# Patient Record
Sex: FEMALE | Race: WHITE
[De-identification: names, ages, dates, MRNs, and addresses within clinical notes are randomized per-mention and may not be internally consistent; named-entity substitution may affect disease eponyms.]

---

## 2019-04-17 ENCOUNTER — HOSPITAL ENCOUNTER (EMERGENCY)
Dept: HOSPITAL 10 - FTE | Age: 54
Discharge: HOME | End: 2019-04-17
Payer: COMMERCIAL

## 2019-04-17 ENCOUNTER — HOSPITAL ENCOUNTER (EMERGENCY)
Dept: HOSPITAL 91 - FTE | Age: 54
Discharge: HOME | End: 2019-04-17
Payer: COMMERCIAL

## 2019-04-17 VITALS — HEART RATE: 73 BPM | SYSTOLIC BLOOD PRESSURE: 163 MMHG | RESPIRATION RATE: 18 BRPM | DIASTOLIC BLOOD PRESSURE: 100 MMHG

## 2019-04-17 VITALS
BODY MASS INDEX: 31.25 KG/M2 | WEIGHT: 176.37 LBS | BODY MASS INDEX: 31.25 KG/M2 | HEIGHT: 63 IN | HEIGHT: 63 IN | WEIGHT: 176.37 LBS

## 2019-04-17 DIAGNOSIS — L03.90: Primary | ICD-10-CM

## 2019-04-17 DIAGNOSIS — F17.210: ICD-10-CM

## 2019-04-17 LAB
ADD MAN DIFF?: NO
ALANINE AMINOTRANSFERASE: 33 IU/L (ref 13–69)
ALBUMIN/GLOBULIN RATIO: 0.97
ALBUMIN: 3.6 G/DL (ref 3.3–4.9)
ALKALINE PHOSPHATASE: 102 IU/L (ref 42–121)
ANION GAP: 6 (ref 5–13)
ASPARTATE AMINO TRANSFERASE: 63 IU/L (ref 15–46)
BASOPHIL #: 0.1 10^3/UL (ref 0–0.1)
BASOPHILS %: 0.8 % (ref 0–2)
BILIRUBIN,DIRECT: 0 MG/DL (ref 0–0.2)
BILIRUBIN,TOTAL: 1.2 MG/DL (ref 0.2–1.3)
BLOOD UREA NITROGEN: 9 MG/DL (ref 7–20)
CALCIUM: 8.9 MG/DL (ref 8.4–10.2)
CARBON DIOXIDE: 36 MMOL/L (ref 21–31)
CHLORIDE: 96 MMOL/L (ref 97–110)
CREATININE: 0.58 MG/DL (ref 0.44–1)
EOSINOPHILS #: 0.2 10^3/UL (ref 0–0.5)
EOSINOPHILS %: 2.1 % (ref 0–7)
GLOBULIN: 3.7 G/DL (ref 1.3–3.2)
GLUCOSE: 81 MG/DL (ref 70–220)
HEMATOCRIT: 45.2 % (ref 37–47)
HEMOGLOBIN: 14.8 G/DL (ref 12–16)
IMMATURE GRANS #M: 0.02 10^3/UL (ref 0–0.03)
IMMATURE GRANS % (M): 0.3 % (ref 0–0.43)
LYMPHOCYTES #: 1.6 10^3/UL (ref 0.8–2.9)
LYMPHOCYTES %: 22.1 % (ref 15–51)
MEAN CORPUSCULAR HEMOGLOBIN: 35.9 PG (ref 29–33)
MEAN CORPUSCULAR HGB CONC: 32.7 G/DL (ref 32–37)
MEAN CORPUSCULAR VOLUME: 109.7 FL (ref 82–101)
MEAN PLATELET VOLUME: 9.5 FL (ref 7.4–10.4)
MONOCYTE #: 0.8 10^3/UL (ref 0.3–0.9)
MONOCYTES %: 11.4 % (ref 0–11)
NEUTROPHIL #: 4.6 10^3/UL (ref 1.6–7.5)
NEUTROPHILS %: 63.3 % (ref 39–77)
NUCLEATED RED BLOOD CELLS #: 0 10^3/UL (ref 0–0)
NUCLEATED RED BLOOD CELLS%: 0 /100WBC (ref 0–0)
PLATELET COUNT: 196 10^3/UL (ref 140–415)
POTASSIUM: 3.9 MMOL/L (ref 3.5–5.1)
RED BLOOD COUNT: 4.12 10^6/UL (ref 4.2–5.4)
RED CELL DISTRIBUTION WIDTH: 14.4 % (ref 11.5–14.5)
SODIUM: 138 MMOL/L (ref 135–144)
TOTAL PROTEIN: 7.3 G/DL (ref 6.1–8.1)
WHITE BLOOD COUNT: 7.2 10^3/UL (ref 4.8–10.8)

## 2019-04-17 PROCEDURE — 36415 COLL VENOUS BLD VENIPUNCTURE: CPT

## 2019-04-17 PROCEDURE — 87040 BLOOD CULTURE FOR BACTERIA: CPT

## 2019-04-17 PROCEDURE — 80053 COMPREHEN METABOLIC PANEL: CPT

## 2019-04-17 PROCEDURE — 85025 COMPLETE CBC W/AUTO DIFF WBC: CPT

## 2019-04-17 PROCEDURE — 96366 THER/PROPH/DIAG IV INF ADDON: CPT

## 2019-04-17 PROCEDURE — 99284 EMERGENCY DEPT VISIT MOD MDM: CPT

## 2019-04-17 PROCEDURE — 96365 THER/PROPH/DIAG IV INF INIT: CPT

## 2019-04-17 RX ADMIN — BACITRACIN ZINC, NEOMYCIN, POLYMYXIN B 1 APPLIC: 400; 3.5; 5 OINTMENT TOPICAL at 16:30

## 2019-04-17 RX ADMIN — FUROSEMIDE 1 MG: 20 TABLET ORAL at 19:17

## 2019-04-17 RX ADMIN — THIAMINE HYDROCHLORIDE 1 MLS/HR: 100 INJECTION, SOLUTION INTRAMUSCULAR; INTRAVENOUS at 16:05

## 2019-04-17 RX ADMIN — BACITRACIN ZINC NEOMYCIN SULFATE POLYMYXIN B SULFATE 1 APPLIC: 400; 3.5; 5 OINTMENT TOPICAL at 14:30

## 2019-04-17 RX ADMIN — VANCOMYCIN HYDROCHLORIDE 1 MLS/HR: 1 INJECTION, POWDER, LYOPHILIZED, FOR SOLUTION INTRAVENOUS at 16:09

## 2019-04-17 NOTE — ERD
ER Documentation


Chief Complaint


Chief Complaint





skin rashes/wounds





HPI


54-year-old female presents with a history of recurrent skin infections in her 


lower extremities.  She was hospitalized approximately 1 month ago.  She has 


been fine more or less for the last month.  She is recurrent wheezing and 


ulcerative lesions on her bilateral shins.  She has scattered lesions on her 


upper extremities of various stages of healing as well.  She denies fevers, 


vomiting, abdominal pain, shortness of breath or chest pain.  Patient has been 


intermittently homeless and living in hotels she is here with her spouse who has


similar lesions on his lower extremities.  Patient states that the most recent 


antibiotic prescribed for this, doxycycline gave her vomiting diarrhea.





ROS


All systems reviewed and are negative except as per history of present illness.





Medications


Home Meds


Active Scripts


Bismuth Subsalicylate* (Pepto-Bismol*) 262 Mg/15 Ml Oral.susp, 15 ML PO Q3H PRN 


for DIARRHEA for 5 Days, ML


   Prov:WAQAR CHILDS MD         4/17/19


Clindamycin Hcl* (Clindamycin Hcl*) 300 Mg Capsule, 300 MG PO QID for 10 Days, 


CAP


   Prov:WAQAR CHILDS MD         4/17/19


Furosemide* (Lasix*) 40 Mg Tablet, 40 MG PO DAILY for 5 Days, #5 TAB


   Prov:WAQAR CHILDS MD         4/17/19


Ondansetron Hcl* (Zofran*) 4 Mg Tablet, 4 MG PO Q8 for Nausea, vomiting , #50 


TAB


   Prov:SHRAVAN STONER MD         8/13/15


Folic Acid* (Folic Acid*) 1 Mg Tablet, 1 MG PO DAILY, #60 TAB


   Prov:SHRAVAN STONER MD         8/13/15


Thiamine* (Thiamine*) 100 Mg Tablet, 100 MG PO DAILY, #60 TAB


   Prov:SHRAVAN STONER MD         8/13/15


Reported Medications


Carisoprodol* (Soma*) 350 Mg Tablet, 350 MG PO TID, TAB


   8/11/15


Methadone Hcl* (Methadone*) 10 Mg Tab, 65 MG PO DAILY, TAB


   5/29/14





Allergies


Allergies:  


Coded Allergies:  


     metoclopramide (Verified  Allergy, Mild, 4/17/19)


     Sulfa (Sulfonamide Antibiotics) (Verified  Allergy, Unknown, RASH, 4/17/19)





PMhx/Soc


History of Surgery:  Yes (laparoscopy x6, eye sx, back sx, c section x2)


Anesthesia Reaction:  No


Hx Neurological Disorder:  No


Hx Respiratory Disorders:  No


Hx Cardiac Disorders:  No


Hx Psychiatric Problems:  No


Hx Miscellaneous Medical Probl:  Yes (endometriosis, uterine fibroid,anxiety, 


chronic pain on methadone)


Hx Alcohol Use:  Yes


Hx Substance Use:  No


Hx Tobacco Use:  Yes


Smoking Status:  Current some day smoker





FmHx


Family History:  No diabetes, No coronary disease, No other





Physical Exam


Vitals





Vital Signs


  Date      Temp  Pulse  Resp  B/P (MAP)   Pulse Ox  O2          O2 Flow    FiO2


Time                                                 Delivery    Rate


   4/17/19  98.4     83    18      130/82        95


     13:26                           (98)





Physical Exam


Const:   No acute distress.  Obese.


Head:   Atraumatic 


Eyes:    Normal Conjunctiva


ENT:    Normal External Ears, Nose and Mouth.


Neck:               Full range of motion. No meningismus.


Resp:   Clear to auscultation bilaterally


Cardio:   Regular rate and rhythm, no murmurs


Abd:    Soft, non tender, non distended. Normal bowel sounds


Skin:   No petechiae or rashes


Back:   No midline or flank tenderness


Ext:    No cyanosis, or edema.  Scattered 1-2 cm weeping ulcerative lesions on 


the anterior shins and lower extremities with surrounding erythema.  No calf 


swelling or Homans sign.  No restricted range of motion weakness.  Scattered 


lesions in various stages of healing is on the forearms as well.


Neur:   Awake and alert


Psych:    Normal Mood and Affect


Result Diagram:  


4/17/19 1433                                                                    


           4/17/19 1433





Results 24 hrs





Laboratory Tests


              Test
                                 4/17/19
14:33


              White Blood Count                      7.2 10^3/ul


              Red Blood Count                       4.12 10^6/ul


              Hemoglobin                               14.8 g/dl


              Hematocrit                                  45.2 %


              Mean Corpuscular Volume                   109.7 fl


              Mean Corpuscular Hemoglobin                35.9 pg


              Mean Corpuscular Hemoglobin
Concent     32.7 g/dl 



              Red Cell Distribution Width                 14.4 %


              Platelet Count                         196 10^3/UL


              Mean Platelet Volume                        9.5 fl


              Immature Granulocytes %                    0.300 %


              Neutrophils %                               63.3 %


              Lymphocytes %                               22.1 %


              Monocytes %                                 11.4 %


              Eosinophils %                                2.1 %


              Basophils %                                  0.8 %


              Nucleated Red Blood Cells %            0.0 /100WBC


              Immature Granulocytes #              0.020 10^3/ul


              Neutrophils #                          4.6 10^3/ul


              Lymphocytes #                          1.6 10^3/ul


              Monocytes #                            0.8 10^3/ul


              Eosinophils #                          0.2 10^3/ul


              Basophils #                            0.1 10^3/ul


              Nucleated Red Blood Cells #            0.0 10^3/ul


              Sodium Level                            138 mmol/L


              Potassium Level                         3.9 mmol/L


              Chloride Level                           96 mmol/L


              Carbon Dioxide Level                     36 mmol/L


              Anion Gap                                        6


              Blood Urea Nitrogen                        9 mg/dl


              Creatinine                              0.58 mg/dl


              Est Glomerular Filtrat Rate
mL/min   > 60 mL/min 



              Glucose Level                             81 mg/dl


              Calcium Level                            8.9 mg/dl


              Total Bilirubin                          1.2 mg/dl


              Direct Bilirubin                        0.00 mg/dl


              Indirect Bilirubin                       1.2 mg/dl


              Aspartate Amino Transf
(AST/SGOT)         63 IU/L 



              Alanine Aminotransferase
(ALT/SGPT)       33 IU/L 



              Alkaline Phosphatase                      102 IU/L


              Total Protein                             7.3 g/dl


              Albumin                                   3.6 g/dl


              Globulin                                 3.70 g/dl


              Albumin/Globulin Ratio                        0.97





Current Medications


 Medications
   Dose
          Sig/Liana
       Start Time
   Status  Last


 (Trade)       Ordered        Route
 PRN     Stop Time              Admin
Dose


                              Reason                                Admin


 Vancomycin     250 ml @ 
     ONCE  ONCE
    4/17/19       DC           4/17/19


HCl            125 mls/hr     IVPB
          14:30
                       16:09



                                             4/17/19 16:29


 Sodium         2,400 ml @ 
   Q0M ONCE
      4/17/19       DC           4/17/19


Chloride       0 mls/hr       IV
            14:22
                       16:05



                                             4/17/19 14:25


 Neomycin/
     1 applic       ONCE  ONCE
    4/17/19       DC       



Polymyxin/
                   TOP
           14:30



Bacitracin
                                  4/17/19 14:31


(Neosporin



Topical Oint)


 Neomycin/
     1 applic       ONCE
 TOP
     4/17/19 4/17/19


Polymyxin/
                                  17:00
                       16:30



Bacitracin
                                  4/18/19 23:59


(Neosporin


(Ud
 Pkt))








Procedures/MDM


Patient presents with recurrent intermittent weeping skin lesions on the upper 


and lower extremities over the last several months.  She currently has no SIRS 


criteria, signs of sepsis.  CBC is normal.  She has no evidence of tachycardia 


or fever.  Patient likely has recurrent staph infections of the extremities.  


There is no signs or symptoms to suggest DVT.  She was given vancomycin 1 g IV 


and will treated with clindamycin given reactions to doxycycline as well as 


history of allergy to sulfa, wound care and instructed to return for worsening 


redness, fevers, new worsening symptoms.  Patient was requesting a short course 


of Lasix for recurrent dependent lower extremity edema as well and will give a 


short course.  Patient denies any history of CHF, shortness of breath, 


additional concerning symptoms.  The patient was stable with no new complaints 


during the ER course. Clinically, there is no current evidence to suggest 


meningitis, sepsis, acute abdomen, pneumonia, stroke,  acute coronary syndrome, 


pulmonary embolism, aortic dissection or any other emergent condition appearing 


to require further evaluation or hospitalization.  Patient counseled regarding 


my diagnostic impression and care plan. Prior to discharge all questions 


answered. Pt agrees with treatment plan and understands strict return 


precautions. Pt is instructed to follow up with primary care provider within 24-


48 hours. Precautionary instructions provided including instructions to return 


to the ER if not improving or for any worsening or changing symptoms or 


concerns.





Departure


Diagnosis:  


   Primary Impression:  


   Cellulitis


   Site of cellulitis:  unspecified site  Qualified Codes:  L03.90 - Cellulitis,


   unspecified


Condition:  Stable











WAQAR CHILDS MD             Apr 17, 2019 17:24